# Patient Record
Sex: FEMALE | Race: WHITE
[De-identification: names, ages, dates, MRNs, and addresses within clinical notes are randomized per-mention and may not be internally consistent; named-entity substitution may affect disease eponyms.]

---

## 2018-01-09 ENCOUNTER — HOSPITAL ENCOUNTER (OUTPATIENT)
Dept: HOSPITAL 92 - SCSMAMMO | Age: 63
Discharge: HOME | End: 2018-01-09
Attending: OBSTETRICS & GYNECOLOGY
Payer: COMMERCIAL

## 2018-01-09 ENCOUNTER — HOSPITAL ENCOUNTER (OUTPATIENT)
Dept: HOSPITAL 92 - BICMAMMO | Age: 63
Discharge: HOME | End: 2018-01-09
Attending: OBSTETRICS & GYNECOLOGY
Payer: COMMERCIAL

## 2018-01-09 DIAGNOSIS — Z13.820: Primary | ICD-10-CM

## 2018-01-09 DIAGNOSIS — Z12.31: Primary | ICD-10-CM

## 2018-01-09 PROCEDURE — 77080 DXA BONE DENSITY AXIAL: CPT

## 2018-01-09 PROCEDURE — 77067 SCR MAMMO BI INCL CAD: CPT

## 2018-01-09 NOTE — MMO
BILATERAL SCREENING MAMMOGRAMS:

 

Date:  01/09/18

 

HISTORY:  

62-year-old female patient presenting for screening mammography. 

 

This patient's mammogram was interpreted with the assistance of computer-aided detection.  

 

COMPARISON:  

12/14/16, 12/02/15, 11/17/14, and 11/04/13. 

 

FINDINGS:

Scattered fibroglandular densities are seen in each breast. There is a  stable benign-appearing calci
fication seen in the left breast. No dominant mass or suspicious grouping of microcalcifications are 
seen in either breast. 

 

IMPRESSION: 

 

BIRADS 2:  Benign Finding(s)

 

Routine annual mammographic screening is recommended. 

 

POS: [facility]Y

## 2018-06-08 ENCOUNTER — HOSPITAL ENCOUNTER (EMERGENCY)
Dept: HOSPITAL 9 - MADERS | Age: 63
Discharge: HOME | End: 2018-06-08
Payer: COMMERCIAL
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
## 2018-06-08 DIAGNOSIS — S05.01XA: Primary | ICD-10-CM

## 2018-06-08 PROCEDURE — 99283 EMERGENCY DEPT VISIT LOW MDM: CPT

## 2019-01-09 ENCOUNTER — HOSPITAL ENCOUNTER (OUTPATIENT)
Dept: HOSPITAL 92 - SCSMAMMO | Age: 64
Discharge: HOME | End: 2019-01-09
Attending: OBSTETRICS & GYNECOLOGY
Payer: COMMERCIAL

## 2019-01-09 DIAGNOSIS — Z12.31: Primary | ICD-10-CM

## 2019-01-09 PROCEDURE — 77067 SCR MAMMO BI INCL CAD: CPT

## 2019-01-09 NOTE — MMO
BILATERAL SCREENING MAMMOGRAM:

 

DATE:  01/09/19 

 

HISTORY:  

63-year-old female for screening mammography. 

 

COMPARISON:  

01/09/18, 12/14/16. 

 

FINDINGS:

Bilateral MLO and CC views of the breasts show predominantly fatty replaced breast parenchyma. A bailee
gn-appearing calcification is seen in the subareolar region of the left breast. There is no evidence 
of suspicious mass, suspicious cluster of microcalcifications, or area of architectural distortion. 

 

Interpretation of this mammogram was performed with the assistance of computer-aided detection.  

 

IMPRESSION: 

 

BIRADS 2:  Benign Finding(s)

 

Annual screening mammography is recommended. 

 

POS: [facility]Y

## 2020-12-04 ENCOUNTER — HOSPITAL ENCOUNTER (INPATIENT)
Dept: HOSPITAL 92 - ERS | Age: 65
LOS: 3 days | Discharge: HOME | DRG: 419 | End: 2020-12-07
Attending: SURGERY | Admitting: SURGERY
Payer: COMMERCIAL

## 2020-12-04 VITALS — BODY MASS INDEX: 32.1 KG/M2

## 2020-12-04 DIAGNOSIS — Z82.49: ICD-10-CM

## 2020-12-04 DIAGNOSIS — K82.A1: ICD-10-CM

## 2020-12-04 DIAGNOSIS — Z91.09: ICD-10-CM

## 2020-12-04 DIAGNOSIS — B96.1: ICD-10-CM

## 2020-12-04 DIAGNOSIS — K80.00: Primary | ICD-10-CM

## 2020-12-04 DIAGNOSIS — Z83.3: ICD-10-CM

## 2020-12-04 DIAGNOSIS — Z98.84: ICD-10-CM

## 2020-12-04 DIAGNOSIS — Z20.828: ICD-10-CM

## 2020-12-04 DIAGNOSIS — Z90.49: ICD-10-CM

## 2020-12-04 DIAGNOSIS — Z82.3: ICD-10-CM

## 2020-12-04 DIAGNOSIS — E66.9: ICD-10-CM

## 2020-12-04 LAB
ALBUMIN SERPL BCG-MCNC: 4.1 G/DL (ref 3.4–4.8)
ALP SERPL-CCNC: 114 U/L (ref 40–110)
ALT SERPL W P-5'-P-CCNC: 7 U/L (ref 8–55)
ANION GAP SERPL CALC-SCNC: 16 MMOL/L (ref 10–20)
AST SERPL-CCNC: 13 U/L (ref 5–34)
BASOPHILS # BLD AUTO: 0 THOU/UL (ref 0–0.2)
BASOPHILS NFR BLD AUTO: 0.2 % (ref 0–1)
BILIRUB SERPL-MCNC: 1.1 MG/DL (ref 0.2–1.2)
BUN SERPL-MCNC: 12 MG/DL (ref 9.8–20.1)
CALCIUM SERPL-MCNC: 9.4 MG/DL (ref 7.8–10.44)
CHLORIDE SERPL-SCNC: 102 MMOL/L (ref 98–107)
CO2 SERPL-SCNC: 25 MMOL/L (ref 23–31)
CREAT CL PREDICTED SERPL C-G-VRATE: 0 ML/MIN (ref 70–130)
EOSINOPHIL # BLD AUTO: 0 THOU/UL (ref 0–0.7)
EOSINOPHIL NFR BLD AUTO: 0 % (ref 0–10)
GLOBULIN SER CALC-MCNC: 3 G/DL (ref 2.4–3.5)
GLUCOSE SERPL-MCNC: 135 MG/DL (ref 80–115)
HGB BLD-MCNC: 13.1 G/DL (ref 12–16)
LIPASE SERPL-CCNC: 11 U/L (ref 8–78)
LYMPHOCYTES # BLD: 1.4 THOU/UL (ref 1.2–3.4)
LYMPHOCYTES NFR BLD AUTO: 10 % (ref 21–51)
MCH RBC QN AUTO: 29.6 PG (ref 27–31)
MCV RBC AUTO: 87.1 FL (ref 78–98)
MONOCYTES # BLD AUTO: 1 THOU/UL (ref 0.11–0.59)
MONOCYTES NFR BLD AUTO: 7 % (ref 0–10)
NEUTROPHILS # BLD AUTO: 11.4 THOU/UL (ref 1.4–6.5)
NEUTROPHILS NFR BLD AUTO: 82.8 % (ref 42–75)
PLATELET # BLD AUTO: 275 THOU/UL (ref 130–400)
POTASSIUM SERPL-SCNC: 3.6 MMOL/L (ref 3.5–5.1)
PROT UR STRIP.AUTO-MCNC: 30 MG/DL
RBC # BLD AUTO: 4.43 MILL/UL (ref 4.2–5.4)
RBC UR QL AUTO: (no result) HPF (ref 0–3)
SODIUM SERPL-SCNC: 139 MMOL/L (ref 136–145)
SP GR UR STRIP: 1.03 (ref 1–1.04)
WBC # BLD AUTO: 13.8 THOU/UL (ref 4.8–10.8)
WBC UR QL AUTO: (no result) HPF (ref 0–3)

## 2020-12-04 PROCEDURE — S0020 INJECTION, BUPIVICAINE HYDRO: HCPCS

## 2020-12-04 PROCEDURE — 87070 CULTURE OTHR SPECIMN AEROBIC: CPT

## 2020-12-04 PROCEDURE — 76705 ECHO EXAM OF ABDOMEN: CPT

## 2020-12-04 PROCEDURE — 93005 ELECTROCARDIOGRAM TRACING: CPT

## 2020-12-04 PROCEDURE — 83690 ASSAY OF LIPASE: CPT

## 2020-12-04 PROCEDURE — 84484 ASSAY OF TROPONIN QUANT: CPT

## 2020-12-04 PROCEDURE — 47532 INJECTION FOR CHOLANGIOGRAM: CPT

## 2020-12-04 PROCEDURE — 81015 MICROSCOPIC EXAM OF URINE: CPT

## 2020-12-04 PROCEDURE — U0002 COVID-19 LAB TEST NON-CDC: HCPCS

## 2020-12-04 PROCEDURE — 96375 TX/PRO/DX INJ NEW DRUG ADDON: CPT

## 2020-12-04 PROCEDURE — 87077 CULTURE AEROBIC IDENTIFY: CPT

## 2020-12-04 PROCEDURE — 87186 SC STD MICRODIL/AGAR DIL: CPT

## 2020-12-04 PROCEDURE — 88304 TISSUE EXAM BY PATHOLOGIST: CPT

## 2020-12-04 PROCEDURE — 96365 THER/PROPH/DIAG IV INF INIT: CPT

## 2020-12-04 PROCEDURE — 80053 COMPREHEN METABOLIC PANEL: CPT

## 2020-12-04 PROCEDURE — 85025 COMPLETE CBC W/AUTO DIFF WBC: CPT

## 2020-12-04 PROCEDURE — 81003 URINALYSIS AUTO W/O SCOPE: CPT

## 2020-12-04 PROCEDURE — 36415 COLL VENOUS BLD VENIPUNCTURE: CPT

## 2020-12-04 PROCEDURE — 87040 BLOOD CULTURE FOR BACTERIA: CPT

## 2020-12-04 PROCEDURE — S0028 INJECTION, FAMOTIDINE, 20 MG: HCPCS

## 2020-12-04 PROCEDURE — 83605 ASSAY OF LACTIC ACID: CPT

## 2020-12-04 PROCEDURE — 87205 SMEAR GRAM STAIN: CPT

## 2020-12-04 RX ADMIN — POTASSIUM CHLORIDE, DEXTROSE MONOHYDRATE AND SODIUM CHLORIDE SCH MLS: 150; 5; 450 INJECTION, SOLUTION INTRAVENOUS at 14:32

## 2020-12-04 RX ADMIN — FAMOTIDINE SCH MG: 10 INJECTION, SOLUTION INTRAVENOUS at 19:59

## 2020-12-04 NOTE — ULT
RIGHT UPPER QUADRANT ABDOMINAL ULTRASOUND:

 

Date:  12/04/2020

 

COMPARISON:  

None. 

 

HISTORY:  

Abdominal pain. 

 

TECHNIQUE:  

Multiplanar Gray scale and color Doppler images were obtained in a right upper quadrant abdominal ult
rasound. 

 

FINDINGS:

The liver is normal in echogenicity without focal lesions or intrahepatic ductal dilatation. There is
 a large nonmobile gallstone in the gallbladder neck. The gallbladder is distended. No gallbladder wa
ll thickening or pericholecystic fluid seen. The common bile duct is normal measuring 2.0 mm. 

 

The pancreatic duct is prominent measuring 5.0 mm. No obvious pancreatic mass is seen. The right kidn
ey is normal in echogenicity without hydronephrosis or calculus and measures 10.3 cm in length. 

 

IMPRESSION: 

1.  Cholelithiasis. 

2.  Nonspecific prominence in the pancreatic duct. 

 

 

POS: EAA

## 2020-12-04 NOTE — HP
CHIEF COMPLAINT:  Right upper quadrant abdominal pain.



HISTORY:  A 65-year-old female with a 3-week history of right upper quadrant pain,

intermittent at that time, worse after eating spicy fatty food.  Yesterday afternoon

after lunch, she developed severe episode of this pain, which is persisted,

associated with nausea and dry heaves as well as some fever. 



PAST MEDICAL HISTORY:  Left bundle branch block, history of obesity.



PAST SURGICAL HISTORY:  She had a sleeve gastrectomy 5 years ago, hiatal hernia

repair at that time, tonsil and adenoidectomy, and a forehead lift. 



MEDICATIONS:  She is on no medications.



ALLERGIES:  NO KNOWN DRUG ALLERGIES.



SOCIAL HISTORY:  She is .  No tobacco.  Rare alcohol.



FAMILY HISTORY:  Diabetes, heart disease, and strokes.



PHYSICAL EXAMINATION:

VITAL SIGNS:  She is afebrile.  Pulse 85, blood pressure 115/52. 

GENERAL:  She is awake, alert, in no apparent distress. 

HEENT:  No jaundice. 

LUNGS:  Clear. 

HEART:  Regular rate and rhythm. 

ABDOMEN:  Soft, very tender in the right upper quadrant.  She has some well-healed

laparoscopic scars. 

EXTREMITIES: Unremarkable.



LABORATORY DATA:  White count is 13.8, hemoglobin and hematocrit are 13 and 38, and

platelet count 275.  Electrolytes are fine.  Alkaline phosphatase is little elevated

at 114.  Ultrasound shows large stone impacted in the gallbladder neck, normal

common bile duct. 



ASSESSMENT:  Severe biliary colic.



PLAN:  Admit.  Laparoscopic cholecystectomy in the morning.  We will check COVID

test. 







Job ID:  [id number]

## 2020-12-05 LAB
ALBUMIN SERPL BCG-MCNC: 3.6 G/DL (ref 3.4–4.8)
ALP SERPL-CCNC: 156 U/L (ref 40–110)
ALT SERPL W P-5'-P-CCNC: 229 U/L (ref 8–55)
ANION GAP SERPL CALC-SCNC: 15 MMOL/L (ref 10–20)
AST SERPL-CCNC: 182 U/L (ref 5–34)
BASOPHILS # BLD AUTO: 0 THOU/UL (ref 0–0.2)
BASOPHILS NFR BLD AUTO: 0.1 % (ref 0–1)
BILIRUB SERPL-MCNC: 3.1 MG/DL (ref 0.2–1.2)
BUN SERPL-MCNC: 13 MG/DL (ref 9.8–20.1)
CALCIUM SERPL-MCNC: 8.8 MG/DL (ref 7.8–10.44)
CHLORIDE SERPL-SCNC: 104 MMOL/L (ref 98–107)
CO2 SERPL-SCNC: 23 MMOL/L (ref 23–31)
CREAT CL PREDICTED SERPL C-G-VRATE: 99 ML/MIN (ref 70–130)
EOSINOPHIL # BLD AUTO: 0 THOU/UL (ref 0–0.7)
EOSINOPHIL NFR BLD AUTO: 0.1 % (ref 0–10)
GLOBULIN SER CALC-MCNC: 2.8 G/DL (ref 2.4–3.5)
GLUCOSE SERPL-MCNC: 121 MG/DL (ref 80–115)
HGB BLD-MCNC: 12.2 G/DL (ref 12–16)
LIPASE SERPL-CCNC: 10 U/L (ref 8–78)
LYMPHOCYTES # BLD: 2.1 THOU/UL (ref 1.2–3.4)
LYMPHOCYTES NFR BLD AUTO: 12.1 % (ref 21–51)
MCH RBC QN AUTO: 29.3 PG (ref 27–31)
MCV RBC AUTO: 89 FL (ref 78–98)
MONOCYTES # BLD AUTO: 1.2 THOU/UL (ref 0.11–0.59)
MONOCYTES NFR BLD AUTO: 7 % (ref 0–10)
NEUTROPHILS # BLD AUTO: 14.2 THOU/UL (ref 1.4–6.5)
NEUTROPHILS NFR BLD AUTO: 80.6 % (ref 42–75)
PLATELET # BLD AUTO: 244 THOU/UL (ref 130–400)
POTASSIUM SERPL-SCNC: 3.8 MMOL/L (ref 3.5–5.1)
RBC # BLD AUTO: 4.16 MILL/UL (ref 4.2–5.4)
SODIUM SERPL-SCNC: 138 MMOL/L (ref 136–145)
WBC # BLD AUTO: 17.6 THOU/UL (ref 4.8–10.8)

## 2020-12-05 PROCEDURE — 0FT44ZZ RESECTION OF GALLBLADDER, PERCUTANEOUS ENDOSCOPIC APPROACH: ICD-10-PCS | Performed by: SURGERY

## 2020-12-05 PROCEDURE — BF101ZZ FLUOROSCOPY OF BILE DUCTS USING LOW OSMOLAR CONTRAST: ICD-10-PCS | Performed by: SURGERY

## 2020-12-05 RX ADMIN — POTASSIUM CHLORIDE, DEXTROSE MONOHYDRATE AND SODIUM CHLORIDE SCH MLS: 150; 5; 450 INJECTION, SOLUTION INTRAVENOUS at 03:40

## 2020-12-05 RX ADMIN — FAMOTIDINE SCH: 10 INJECTION, SOLUTION INTRAVENOUS at 21:08

## 2020-12-05 RX ADMIN — POTASSIUM CHLORIDE, DEXTROSE MONOHYDRATE AND SODIUM CHLORIDE SCH: 150; 5; 450 INJECTION, SOLUTION INTRAVENOUS at 21:19

## 2020-12-05 RX ADMIN — FAMOTIDINE SCH: 10 INJECTION, SOLUTION INTRAVENOUS at 18:04

## 2020-12-05 NOTE — RAD
OPERATIVE CHOLANGIOGRAM:

12/5/20

 

A single fluoroscopic image is presented from the OR. 

 

INDICATIONS:

Cholangiogram during laparoscopic cholecystectomy.

 

FINDINGS/IMPRESSION: 

This single view shows opacification of the common bile duct. No filling defect identified. 

 

POS: AGW

## 2020-12-05 NOTE — OP
DATE OF PROCEDURE:  12/05/2020



PREOPERATIVE DIAGNOSIS:  Acute cholecystitis.



PROCEDURE PERFORMED:  Laparoscopic cholecystectomy with intraoperative cholangiogram.



INDICATIONS:  This is a 65-year-old female with severe right upper quadrant pain

radiating to the back, went to the emergency room.  CT showed distended gallbladder

with impacted gallstone in her neck consistent with acute cholecystitis. 



FINDINGS:  Gangrenous gallbladder containing pus, somewhat necrotic.  Cholangiogram

was negative. 



DESCRIPTION OF PROCEDURE:  After informed consent was obtained, the patient was

taken to the operating room and given general endotracheal anesthesia, placed in the

supine position.  Abdomen was prepped and draped in usual fashion.  Local anesthesia

was infiltrated subcutaneously and deep.  A subumbilical incision was performed.

Subcu divided sharply.  The fascia was grasped and 2 stay sutures of 0 Vicryl placed

through each side of midline.  Midline incised.  Digital palpation revealed no local

adhesions.  A blunt 12 mm trocar inserted.  Pneumoperitoneum was created to a

pressure of 15 mmHg.  A 0-degree laparoscope inserted under direct vision, three

5-mm ports were placed subcostally.  The gallbladder was encased with omentum.  This

was taken down bluntly to reveal a very inflamed gallbladder that was distended.

The aspirating needle was inserted and 90 mL of purulent fluid removed.  This was

sent for culture and sensitivity.  The gallbladder grasped, advanced superiorly.

The peritoneum lysed distally to expose the cystic duct and artery.  A clip was

placed on the cystic duct at the gallbladder and an incision made in the cystic

duct.  An Arrow cholangiocatheter inserted.  An intraoperative cholangiogram was

performed utilizing fluoroscopy showing free flow into the duodenum and no filling

defects.  The duct was triply ligated with hemoclips and divided.  The artery was

dissected out, triply ligated and divided.  The gallbladder removed from its fossa

utilizing electrocautery.  It was placed in an endosac and removed from the abdomen

in the endosac.  Hemostasis achieved with electrocautery and Kylie powder.  A drain

was placed and brought out through the lateral-most incision and placed in the

subhepatic space.  Hemostasis assured.  Trocars 

and retractors removed.  The fascia closed with interrupted 0 Vicryl suture.  The

skin closed with interrupted 4-0 Rapide.  Dermabond applied.  The patient tolerated

the procedure well, transferred to Recovery in good condition.  Sponge and needle

count verified correct x2. 







Job ID:  [id number]

## 2020-12-06 LAB
ALBUMIN SERPL BCG-MCNC: 3.1 G/DL (ref 3.4–4.8)
ALP SERPL-CCNC: 117 U/L (ref 40–110)
ALT SERPL W P-5'-P-CCNC: 126 U/L (ref 8–55)
ANION GAP SERPL CALC-SCNC: 12 MMOL/L (ref 10–20)
AST SERPL-CCNC: 50 U/L (ref 5–34)
BILIRUB SERPL-MCNC: 1 MG/DL (ref 0.2–1.2)
BUN SERPL-MCNC: 17 MG/DL (ref 9.8–20.1)
CALCIUM SERPL-MCNC: 8.7 MG/DL (ref 7.8–10.44)
CHLORIDE SERPL-SCNC: 108 MMOL/L (ref 98–107)
CO2 SERPL-SCNC: 23 MMOL/L (ref 23–31)
CREAT CL PREDICTED SERPL C-G-VRATE: 106 ML/MIN (ref 70–130)
GLOBULIN SER CALC-MCNC: 2.6 G/DL (ref 2.4–3.5)
GLUCOSE SERPL-MCNC: 117 MG/DL (ref 80–115)
HGB BLD-MCNC: 10.4 G/DL (ref 12–16)
LIPASE SERPL-CCNC: 6 U/L (ref 8–78)
MCH RBC QN AUTO: 29.9 PG (ref 27–31)
MCV RBC AUTO: 89.4 FL (ref 78–98)
MDIFF COMPLETE?: YES
PLATELET # BLD AUTO: 196 THOU/UL (ref 130–400)
POTASSIUM SERPL-SCNC: 3.9 MMOL/L (ref 3.5–5.1)
RBC # BLD AUTO: 3.48 MILL/UL (ref 4.2–5.4)
SODIUM SERPL-SCNC: 139 MMOL/L (ref 136–145)
WBC # BLD AUTO: 14.2 THOU/UL (ref 4.8–10.8)

## 2020-12-06 RX ADMIN — FAMOTIDINE SCH: 10 INJECTION, SOLUTION INTRAVENOUS at 23:27

## 2020-12-06 RX ADMIN — FAMOTIDINE SCH: 10 INJECTION, SOLUTION INTRAVENOUS at 10:25

## 2020-12-06 NOTE — PRG
DATE OF SERVICE:  12/06/2020



SUBJECTIVE:  The patient states pain is improving.  No nausea or vomiting.  She has

not passed anything out of bottom since surgery. 



OBJECTIVE:  VITAL SIGNS:  Her temperature is 98.9, pulse 60, blood pressure 116/75.

The drain output is 130 from her MIRZA, urine output is okay. 

GENERAL:  She is awake, alert.



LABORATORY DATA:  Her white count is 14, H and H 10 and 31, and platelet count 196.

Her bilirubin is down to 1.  Microbiology, she is growing a gram-negative karyn. 



ASSESSMENT:  Gangrenous cholecystitis.



PLAN:  Continue antibiotics.  Try some full liquids.







Job ID:  [id number]

## 2020-12-07 VITALS — TEMPERATURE: 97.8 F | SYSTOLIC BLOOD PRESSURE: 114 MMHG | DIASTOLIC BLOOD PRESSURE: 73 MMHG

## 2020-12-07 RX ADMIN — FAMOTIDINE SCH: 10 INJECTION, SOLUTION INTRAVENOUS at 08:49

## 2020-12-08 NOTE — DIS
DATE OF ADMISSION:  12/04/2020



DATE OF DISCHARGE:  12/07/2020



DISCHARGE DIAGNOSIS:  Acute cholecystitis.



PROCEDURES DURING ADMISSION:  Laparoscopic cholecystectomy with intraoperative

cholangiogram. 



HOSPITAL COURSE:  The patient was admitted, given IV antibiotics, taken to the

operating room where she underwent a laparoscopic cholecystectomy and cholangiogram.

 Postoperatively, she has done well.  Drains have been removed.  She is tolerating a

regular diet.  She has had a couple of loose stools.  She is afebrile.  She is

discharged home on hydrocodone, Zofran, and ciprofloxacin as her cultures grew out

Klebsiella pneumonia.  She will follow up with me in 2 weeks. 







Job ID:  [id number]

## 2024-05-09 ENCOUNTER — HOSPITAL ENCOUNTER (OUTPATIENT)
Dept: HOSPITAL 92 - BICMAMMO | Age: 69
Discharge: HOME | End: 2024-05-09
Attending: OBSTETRICS & GYNECOLOGY
Payer: MEDICARE

## 2024-05-09 DIAGNOSIS — Z13.820: Primary | ICD-10-CM

## 2024-05-09 DIAGNOSIS — Z78.0: ICD-10-CM

## 2024-05-09 PROCEDURE — 77080 DXA BONE DENSITY AXIAL: CPT

## 2024-11-19 ENCOUNTER — HOSPITAL ENCOUNTER (OUTPATIENT)
Dept: HOSPITAL 92 - CSHULT | Age: 69
Discharge: HOME | End: 2024-11-19
Attending: INTERNAL MEDICINE
Payer: MEDICARE

## 2024-11-19 DIAGNOSIS — I51.7: ICD-10-CM

## 2024-11-19 DIAGNOSIS — I44.2: ICD-10-CM

## 2024-11-19 DIAGNOSIS — I48.0: Primary | ICD-10-CM

## 2024-11-19 DIAGNOSIS — I48.3: ICD-10-CM

## 2024-11-19 PROCEDURE — 93306 TTE W/DOPPLER COMPLETE: CPT
